# Patient Record
Sex: MALE | Race: WHITE | NOT HISPANIC OR LATINO | Employment: FULL TIME | URBAN - METROPOLITAN AREA
[De-identification: names, ages, dates, MRNs, and addresses within clinical notes are randomized per-mention and may not be internally consistent; named-entity substitution may affect disease eponyms.]

---

## 2021-01-19 ENCOUNTER — OFFICE VISIT (OUTPATIENT)
Dept: URGENT CARE | Facility: PHYSICIAN GROUP | Age: 31
End: 2021-01-19
Payer: COMMERCIAL

## 2021-01-19 ENCOUNTER — HOSPITAL ENCOUNTER (OUTPATIENT)
Facility: MEDICAL CENTER | Age: 31
End: 2021-01-19
Attending: PHYSICIAN ASSISTANT
Payer: COMMERCIAL

## 2021-01-19 VITALS
OXYGEN SATURATION: 97 % | TEMPERATURE: 98.4 F | HEIGHT: 70 IN | HEART RATE: 90 BPM | RESPIRATION RATE: 12 BRPM | WEIGHT: 240 LBS | DIASTOLIC BLOOD PRESSURE: 80 MMHG | BODY MASS INDEX: 34.36 KG/M2 | SYSTOLIC BLOOD PRESSURE: 132 MMHG

## 2021-01-19 DIAGNOSIS — J11.1 INFLUENZA-LIKE ILLNESS: ICD-10-CM

## 2021-01-19 LAB
FLUAV+FLUBV AG SPEC QL IA: NORMAL
INT CON NEG: NEGATIVE
INT CON POS: POSITIVE

## 2021-01-19 PROCEDURE — 99203 OFFICE O/P NEW LOW 30 MIN: CPT | Performed by: PHYSICIAN ASSISTANT

## 2021-01-19 PROCEDURE — U0005 INFEC AGEN DETEC AMPLI PROBE: HCPCS

## 2021-01-19 PROCEDURE — U0003 INFECTIOUS AGENT DETECTION BY NUCLEIC ACID (DNA OR RNA); SEVERE ACUTE RESPIRATORY SYNDROME CORONAVIRUS 2 (SARS-COV-2) (CORONAVIRUS DISEASE [COVID-19]), AMPLIFIED PROBE TECHNIQUE, MAKING USE OF HIGH THROUGHPUT TECHNOLOGIES AS DESCRIBED BY CMS-2020-01-R: HCPCS

## 2021-01-19 PROCEDURE — 87804 INFLUENZA ASSAY W/OPTIC: CPT | Performed by: PHYSICIAN ASSISTANT

## 2021-01-19 ASSESSMENT — ENCOUNTER SYMPTOMS
DIARRHEA: 0
SINUS PAIN: 0
STRIDOR: 0
VOMITING: 0
MYALGIAS: 1
CHILLS: 1
DIAPHORESIS: 0
PALPITATIONS: 0
WHEEZING: 0
NAUSEA: 0
HEADACHES: 1
HEARTBURN: 0
COUGH: 1
SHORTNESS OF BREATH: 1
DIZZINESS: 0
FEVER: 1
SPUTUM PRODUCTION: 1
ABDOMINAL PAIN: 0
EYE PAIN: 0
SORE THROAT: 0

## 2021-01-19 NOTE — PROGRESS NOTES
Subjective:   Hanna Chen is a 30 y.o. male who presents for Fatigue (chills, headaches, eyes hurt, coughing with flem, SOB )    HPI:  This is a very pleasant 30-year-old male presenting to the clinic with fatigue, body aches, chills headaches, cough and shortness of breath x24 hours.  Patient states his symptoms started last night.  States they have remained fairly constant.  His cough is productive of clear sputum.  States he occasionally feels short of breath.  Denies any chest pain or pressure. Continues to have generalized body aches.  He took DayQuil today which did provide moderate relief.  He has not noticed if he has been running a fever but states he does get hot and cold throughout the day.  He has no known direct contact to anyone who tested positive for COVID-19.      Review of Systems   Constitutional: Positive for chills, fever and malaise/fatigue. Negative for diaphoresis.   HENT: Positive for congestion. Negative for ear pain, sinus pain and sore throat.    Eyes: Negative for pain (Pressure behind eyes.).   Respiratory: Positive for cough, sputum production and shortness of breath. Negative for wheezing and stridor.    Cardiovascular: Negative for chest pain and palpitations.   Gastrointestinal: Negative for abdominal pain, diarrhea, heartburn, nausea and vomiting.   Musculoskeletal: Positive for myalgias.   Neurological: Positive for headaches. Negative for dizziness.   Endo/Heme/Allergies: Negative for environmental allergies.       Medications:    • This patient does not have an active medication from one of the medication groupers.    Allergies: Patient has no known allergies.    Problem List: Hanna Chen does not have a problem list on file.    Surgical History:  No past surgical history on file.    Past Social Hx: Hanna Chen  reports that he has never smoked. His smokeless tobacco use includes chew. He reports that he does not drink alcohol or use drugs.     Past Family  "Hx:  Hanna Chen family history is not on file.     Problem list, medications, and allergies reviewed by myself today in Epic.     Objective:     /80 (BP Location: Left arm, Patient Position: Sitting, BP Cuff Size: Large adult)   Pulse 90   Temp 36.9 °C (98.4 °F) (Temporal)   Resp 12   Ht 1.778 m (5' 10\")   Wt 108.9 kg (240 lb)   SpO2 97%   BMI 34.44 kg/m²     Physical Exam  Constitutional:       General: He is not in acute distress.     Appearance: Normal appearance. He is not ill-appearing, toxic-appearing or diaphoretic.   HENT:      Head: Normocephalic and atraumatic.      Right Ear: Ear canal and external ear normal.      Left Ear: Ear canal and external ear normal.      Ears:      Comments: Bilateral TMs mildly erythematous.     Nose: Congestion present. No rhinorrhea.      Mouth/Throat:      Mouth: Mucous membranes are moist.      Pharynx: Posterior oropharyngeal erythema present. No oropharyngeal exudate.      Comments: Posterior oropharynx mildly erythematous.  No tonsillar edema or exudate.  Uvula midline nondeviated.  Eyes:      Conjunctiva/sclera: Conjunctivae normal.   Neck:      Musculoskeletal: Normal range of motion. No neck rigidity or muscular tenderness.   Cardiovascular:      Rate and Rhythm: Normal rate and regular rhythm.      Pulses: Normal pulses.      Heart sounds: Normal heart sounds.   Pulmonary:      Effort: Pulmonary effort is normal.      Breath sounds: Normal breath sounds. No wheezing.   Lymphadenopathy:      Cervical: No cervical adenopathy.   Skin:     General: Skin is warm and dry.      Capillary Refill: Capillary refill takes less than 2 seconds.   Neurological:      Mental Status: He is alert.   Psychiatric:         Mood and Affect: Mood normal.         Thought Content: Thought content normal.       Rapid Flu: Neg    Assessment/Plan:     Diagnosis and associated orders:     1. Influenza-like illness  POCT Influenza A/B    COVID/SARS CoV-2 PCR    "   Comments/MDM:     Recommended strict isolation precautions.  Stay isolated until I reach out with final results.  Results may take 2 to 3 days.  Recommended supportive treatment including increase fluids and rest.  Use Tylenol and ibuprofen as needed for pain and fever.  Red flag symptoms were discussed with the patient at length today.  If any of these symptoms present return to the clinic or nearest emergency department.  Please call with any questions or concerns.           Differential diagnosis, natural history, supportive care, and indications for immediate follow-up discussed.    Advised the patient to follow-up with the primary care physician for recheck, reevaluation, and consideration of further management.    Please note that this dictation was created using voice recognition software. I have made reasonable attempt to correct obvious errors, but I expect that there are errors of grammar and possibly content that I did not discover before finalizing the note.    This note was electronically signed by MILKA Moore PA-C

## 2021-01-20 ENCOUNTER — TELEPHONE (OUTPATIENT)
Dept: URGENT CARE | Facility: PHYSICIAN GROUP | Age: 31
End: 2021-01-20

## 2021-01-20 LAB
COVID ORDER STATUS COVID19: NORMAL
SARS-COV-2 RNA RESP QL NAA+PROBE: DETECTED
SPECIMEN SOURCE: ABNORMAL

## 2021-01-20 NOTE — TELEPHONE ENCOUNTER
Called and spoke with patient about his positive COVID-19 test result.  He is going to contact IT for help with downloading my chart.